# Patient Record
Sex: MALE | Race: WHITE | NOT HISPANIC OR LATINO
[De-identification: names, ages, dates, MRNs, and addresses within clinical notes are randomized per-mention and may not be internally consistent; named-entity substitution may affect disease eponyms.]

---

## 2017-03-02 ENCOUNTER — TRANSCRIPTION ENCOUNTER (OUTPATIENT)
Age: 32
End: 2017-03-02

## 2022-04-21 ENCOUNTER — APPOINTMENT (OUTPATIENT)
Dept: HUMAN REPRODUCTION | Facility: CLINIC | Age: 37
End: 2022-04-21

## 2022-07-25 ENCOUNTER — NON-APPOINTMENT (OUTPATIENT)
Age: 37
End: 2022-07-25

## 2023-06-01 ENCOUNTER — EMERGENCY (EMERGENCY)
Facility: HOSPITAL | Age: 38
LOS: 1 days | Discharge: ANOTHER HOSPITAL | End: 2023-06-01
Attending: EMERGENCY MEDICINE | Admitting: EMERGENCY MEDICINE
Payer: COMMERCIAL

## 2023-06-01 ENCOUNTER — EMERGENCY (EMERGENCY)
Facility: HOSPITAL | Age: 38
LOS: 1 days | Discharge: HOME | End: 2023-06-01
Attending: EMERGENCY MEDICINE | Admitting: EMERGENCY MEDICINE
Payer: COMMERCIAL

## 2023-06-01 VITALS
TEMPERATURE: 98 F | RESPIRATION RATE: 15 BRPM | OXYGEN SATURATION: 98 % | HEART RATE: 77 BPM | SYSTOLIC BLOOD PRESSURE: 137 MMHG | DIASTOLIC BLOOD PRESSURE: 77 MMHG

## 2023-06-01 VITALS
HEART RATE: 81 BPM | OXYGEN SATURATION: 100 % | SYSTOLIC BLOOD PRESSURE: 143 MMHG | RESPIRATION RATE: 16 BRPM | DIASTOLIC BLOOD PRESSURE: 87 MMHG | TEMPERATURE: 99 F

## 2023-06-01 VITALS
TEMPERATURE: 99 F | SYSTOLIC BLOOD PRESSURE: 122 MMHG | RESPIRATION RATE: 18 BRPM | DIASTOLIC BLOOD PRESSURE: 76 MMHG | HEIGHT: 68 IN | HEART RATE: 92 BPM | OXYGEN SATURATION: 99 % | WEIGHT: 160.06 LBS

## 2023-06-01 LAB
ALBUMIN SERPL ELPH-MCNC: 4 G/DL — SIGNIFICANT CHANGE UP (ref 3.3–5)
ALP SERPL-CCNC: 71 U/L — SIGNIFICANT CHANGE UP (ref 30–120)
ALT FLD-CCNC: 21 U/L DA — SIGNIFICANT CHANGE UP (ref 10–60)
ANION GAP SERPL CALC-SCNC: 9 MMOL/L — SIGNIFICANT CHANGE UP (ref 5–17)
AST SERPL-CCNC: 16 U/L — SIGNIFICANT CHANGE UP (ref 10–40)
BASOPHILS # BLD AUTO: 0.06 K/UL — SIGNIFICANT CHANGE UP (ref 0–0.2)
BASOPHILS NFR BLD AUTO: 0.6 % — SIGNIFICANT CHANGE UP (ref 0–2)
BILIRUB SERPL-MCNC: 0.5 MG/DL — SIGNIFICANT CHANGE UP (ref 0.2–1.2)
BUN SERPL-MCNC: 11 MG/DL — SIGNIFICANT CHANGE UP (ref 7–23)
CALCIUM SERPL-MCNC: 9.2 MG/DL — SIGNIFICANT CHANGE UP (ref 8.4–10.5)
CHLORIDE SERPL-SCNC: 104 MMOL/L — SIGNIFICANT CHANGE UP (ref 96–108)
CO2 SERPL-SCNC: 28 MMOL/L — SIGNIFICANT CHANGE UP (ref 22–31)
CREAT SERPL-MCNC: 1 MG/DL — SIGNIFICANT CHANGE UP (ref 0.5–1.3)
EGFR: 101 ML/MIN/1.73M2 — SIGNIFICANT CHANGE UP
EOSINOPHIL # BLD AUTO: 0.46 K/UL — SIGNIFICANT CHANGE UP (ref 0–0.5)
EOSINOPHIL NFR BLD AUTO: 4.8 % — SIGNIFICANT CHANGE UP (ref 0–6)
GLUCOSE SERPL-MCNC: 136 MG/DL — HIGH (ref 70–99)
HCT VFR BLD CALC: 43 % — SIGNIFICANT CHANGE UP (ref 39–50)
HGB BLD-MCNC: 14.5 G/DL — SIGNIFICANT CHANGE UP (ref 13–17)
IMM GRANULOCYTES NFR BLD AUTO: 0.2 % — SIGNIFICANT CHANGE UP (ref 0–0.9)
LACTATE SERPL-SCNC: 1.3 MMOL/L — SIGNIFICANT CHANGE UP (ref 0.7–2)
LYMPHOCYTES # BLD AUTO: 2.67 K/UL — SIGNIFICANT CHANGE UP (ref 1–3.3)
LYMPHOCYTES # BLD AUTO: 27.7 % — SIGNIFICANT CHANGE UP (ref 13–44)
MCHC RBC-ENTMCNC: 31 PG — SIGNIFICANT CHANGE UP (ref 27–34)
MCHC RBC-ENTMCNC: 33.7 GM/DL — SIGNIFICANT CHANGE UP (ref 32–36)
MCV RBC AUTO: 92.1 FL — SIGNIFICANT CHANGE UP (ref 80–100)
MONOCYTES # BLD AUTO: 0.65 K/UL — SIGNIFICANT CHANGE UP (ref 0–0.9)
MONOCYTES NFR BLD AUTO: 6.7 % — SIGNIFICANT CHANGE UP (ref 2–14)
NEUTROPHILS # BLD AUTO: 5.79 K/UL — SIGNIFICANT CHANGE UP (ref 1.8–7.4)
NEUTROPHILS NFR BLD AUTO: 60 % — SIGNIFICANT CHANGE UP (ref 43–77)
NRBC # BLD: 0 /100 WBCS — SIGNIFICANT CHANGE UP (ref 0–0)
PLATELET # BLD AUTO: 227 K/UL — SIGNIFICANT CHANGE UP (ref 150–400)
POTASSIUM SERPL-MCNC: 3.8 MMOL/L — SIGNIFICANT CHANGE UP (ref 3.5–5.3)
POTASSIUM SERPL-SCNC: 3.8 MMOL/L — SIGNIFICANT CHANGE UP (ref 3.5–5.3)
PROT SERPL-MCNC: 6.9 G/DL — SIGNIFICANT CHANGE UP (ref 6–8.3)
RAPID RVP RESULT: SIGNIFICANT CHANGE UP
RBC # BLD: 4.67 M/UL — SIGNIFICANT CHANGE UP (ref 4.2–5.8)
RBC # FLD: 12.5 % — SIGNIFICANT CHANGE UP (ref 10.3–14.5)
SARS-COV-2 RNA SPEC QL NAA+PROBE: SIGNIFICANT CHANGE UP
SODIUM SERPL-SCNC: 141 MMOL/L — SIGNIFICANT CHANGE UP (ref 135–145)
WBC # BLD: 9.65 K/UL — SIGNIFICANT CHANGE UP (ref 3.8–10.5)
WBC # FLD AUTO: 9.65 K/UL — SIGNIFICANT CHANGE UP (ref 3.8–10.5)

## 2023-06-01 PROCEDURE — 99284 EMERGENCY DEPT VISIT MOD MDM: CPT

## 2023-06-01 PROCEDURE — 70491 CT SOFT TISSUE NECK W/DYE: CPT | Mod: 26,MA

## 2023-06-01 PROCEDURE — 80053 COMPREHEN METABOLIC PANEL: CPT

## 2023-06-01 PROCEDURE — 96375 TX/PRO/DX INJ NEW DRUG ADDON: CPT

## 2023-06-01 PROCEDURE — 87040 BLOOD CULTURE FOR BACTERIA: CPT

## 2023-06-01 PROCEDURE — 99285 EMERGENCY DEPT VISIT HI MDM: CPT

## 2023-06-01 PROCEDURE — 83605 ASSAY OF LACTIC ACID: CPT

## 2023-06-01 PROCEDURE — 70491 CT SOFT TISSUE NECK W/DYE: CPT | Mod: MA

## 2023-06-01 PROCEDURE — 85025 COMPLETE CBC W/AUTO DIFF WBC: CPT

## 2023-06-01 PROCEDURE — 36415 COLL VENOUS BLD VENIPUNCTURE: CPT

## 2023-06-01 PROCEDURE — 99285 EMERGENCY DEPT VISIT HI MDM: CPT | Mod: 25

## 2023-06-01 PROCEDURE — 0225U NFCT DS DNA&RNA 21 SARSCOV2: CPT

## 2023-06-01 PROCEDURE — 96365 THER/PROPH/DIAG IV INF INIT: CPT | Mod: XU

## 2023-06-01 RX ORDER — SODIUM CHLORIDE 9 MG/ML
1000 INJECTION INTRAMUSCULAR; INTRAVENOUS; SUBCUTANEOUS ONCE
Refills: 0 | Status: COMPLETED | OUTPATIENT
Start: 2023-06-01 | End: 2023-06-01

## 2023-06-01 RX ORDER — FAMOTIDINE 10 MG/ML
20 INJECTION INTRAVENOUS ONCE
Refills: 0 | Status: COMPLETED | OUTPATIENT
Start: 2023-06-01 | End: 2023-06-01

## 2023-06-01 RX ORDER — DIPHENHYDRAMINE HCL 50 MG
50 CAPSULE ORAL ONCE
Refills: 0 | Status: COMPLETED | OUTPATIENT
Start: 2023-06-01 | End: 2023-06-01

## 2023-06-01 RX ORDER — AMPICILLIN SODIUM AND SULBACTAM SODIUM 250; 125 MG/ML; MG/ML
3 INJECTION, POWDER, FOR SUSPENSION INTRAMUSCULAR; INTRAVENOUS ONCE
Refills: 0 | Status: COMPLETED | OUTPATIENT
Start: 2023-06-01 | End: 2023-06-01

## 2023-06-01 RX ADMIN — SODIUM CHLORIDE 1000 MILLILITER(S): 9 INJECTION INTRAMUSCULAR; INTRAVENOUS; SUBCUTANEOUS at 20:12

## 2023-06-01 RX ADMIN — FAMOTIDINE 20 MILLIGRAM(S): 10 INJECTION INTRAVENOUS at 18:12

## 2023-06-01 RX ADMIN — AMPICILLIN SODIUM AND SULBACTAM SODIUM 3 GRAM(S): 250; 125 INJECTION, POWDER, FOR SUSPENSION INTRAMUSCULAR; INTRAVENOUS at 20:12

## 2023-06-01 RX ADMIN — Medication 125 MILLIGRAM(S): at 18:12

## 2023-06-01 RX ADMIN — SODIUM CHLORIDE 1000 MILLILITER(S): 9 INJECTION INTRAMUSCULAR; INTRAVENOUS; SUBCUTANEOUS at 18:12

## 2023-06-01 RX ADMIN — AMPICILLIN SODIUM AND SULBACTAM SODIUM 200 GRAM(S): 250; 125 INJECTION, POWDER, FOR SUSPENSION INTRAMUSCULAR; INTRAVENOUS at 18:59

## 2023-06-01 RX ADMIN — Medication 50 MILLIGRAM(S): at 18:12

## 2023-06-01 NOTE — ED PROVIDER NOTE - ATTENDING CONTRIBUTION TO CARE
This is a 37-year-old male states he was at [place] he thinks he might of gotten bit by something because he did see a few dots on his forearm was nonpainful was nonpruritic it has now formed serous vesicles with redness around the site he then noticed he had a rash on his arms chest and back also nonpruritic.  Patient states he then noticed some throat swelling feeling like razor blades were in his foot throat and he was unable to eat due to it he also noticed a change in his voice.  He presented to [facility] he received Benadryl steroids and a CAT scan showing swelling near the hyoid.  He was transferred to this facility for ENT evaluation.  Patient states he does feel much better now his throat swelling is better and his voice is back to normal.  He denies any fevers any chills anybody with similar symptoms any numbness any tingling any rash on the palms or soles no rash noted in his mouth.    General: Well appearing, well nourished, in no distress  Head: Normocephalic, atraumatic  Eyes: Conjunctiva clear, sclera non-icteric, EOM intact, PERRL  Mouth: Mucous membranes moist, no mucosal lesions mild pharyngeal edema   Neck: Supple  Heart: Regular rate and rhythm, no murmur or gallop  Lungs: Clear to auscultation  Abdomen: Bowel sounds present, soft, no tenderness, non distended, no organomegaly  Extremities: No amputations or deformities, cyanosis, edema  Musculoskeletal: No crepitation, defects or decreased range of motion, strength intact throughout, pulses intact  Neurologic: No gross deficits CN II-XII intact Sensation intact  Psychiatric: Oriented X3, normal mood and affect  Skin: Warm,dry. Cap refill <2 seconds. raised maculopapular blanching rash on torso and arms R forearm with small vesicles non dermatomal with erythema surrounding  will have ENT evaluate at this time This is a 37-year-old male states he was at [place] he thinks he might of gotten bit by something because he did see a few dots on his forearm was nonpainful was nonpruritic it has now formed serous vesicles with redness around the site he then noticed he had a rash on his arms chest and back also nonpruritic.  Patient states he then noticed some throat swelling feeling like razor blades were in his foot throat and he was unable to eat due to it he also noticed a change in his voice.  He presented to [facility] he received Benadryl steroids and a CAT scan showing swelling near the hyoid.  He was transferred to this facility for ENT evaluation.  Patient states he does feel much better now his throat swelling is better and his voice is back to normal.  He denies any fevers any chills anybody with similar symptoms any numbness any tingling any rash on the palms or soles no rash noted in his mouth.    General: Well appearing, well nourished, in no distress  Head: Normocephalic, atraumatic  Eyes: Conjunctiva clear, sclera non-icteric, EOM intact, PERRL  Mouth: Mucous membranes moist, no mucosal lesions mild pharyngeal erythema no edema   Neck: Supple no swelling non ttp no stridor   Heart: Regular rate and rhythm, no murmur or gallop  Lungs: Clear to auscultation  Abdomen: Bowel sounds present, soft, no tenderness, non distended, no organomegaly  Extremities: No amputations or deformities, cyanosis, edema  Musculoskeletal: No crepitation, defects or decreased range of motion, strength intact throughout, pulses intact  Neurologic: No gross deficits CN II-XII intact Sensation intact  Psychiatric: Oriented X3, normal mood and affect  Skin: Warm,dry. Cap refill <2 seconds. raised maculopapular blanching rash on torso and arms R forearm with small vesicles non dermatomal with erythema surrounding  will have ENT evaluate at this time.Offered pt CDU stay however pt prefers going home and following up with derm as outpatient.

## 2023-06-01 NOTE — ED ADULT NURSE REASSESSMENT NOTE - NS ED NURSE REASSESS COMMENT FT1
received report and assumed care of pt at change of shift. pt awake and alert. denies pain at this time. respirations even and unlabored. no stridor, no drooling,, no audible wheezes. up and about ambulatory to bathroom. visitor at bedside. informed [facility]. NAD

## 2023-06-01 NOTE — ED PROVIDER NOTE - OBJECTIVE STATEMENT
37-year-old male with no known past medical history presents with has been having a slight rash to the right arm/antecubital area over the past 4 to 5 days.  Patient is now developed generalized rash and itchiness on his trunk and neck.  Over the past 1 to 2 days, patient's been having increasing dyspnea and difficulty swallowing.  No known fever or chills.  No acute chest pain.  No abdominal pain.  No vomiting or diarrhea.  Possible started after a bite.  No other known exposures.  No aggravating or alleviating factors otherwise noted.  No other acute complaints.  Patient's previously vaccinated for COVID

## 2023-06-01 NOTE — ED ADULT TRIAGE NOTE - CHIEF COMPLAINT QUOTE
" I was [place], I developed a rash on my right arm, now it has spread, went to Urgent Care and they gave Rx for a cream, they said it may be poison ivy, but now throat is swollen, hard to swallow '

## 2023-06-01 NOTE — ED PROVIDER NOTE - PHYSICAL EXAMINATION
Gen - NAD; well-appearing; A+Ox3   HEENT - NCAT, EOMI, moist mucous membranes, clear oropharynx, midline uvula, no tongue enlargement, no appreciable swelling  Neck - supple  Resp - CTAB, no increased WOB, no stridor  CV -  RRR, no m/r/g  Abd - soft, NT, ND; no guarding or rebound  Back - no CVA tenderness  MSK - FROM of b/l UE and LE, no gross deformities  Extrem - no LE edema  Neuro - no focal motor or sensation deficits  Skin - warm, well perfused; diffuse erythematous macules/patches over trunk, blanching, no nikolsky's sign, no mucosal involvement; small patch of vesicles over R medial forearm

## 2023-06-01 NOTE — ED PROVIDER NOTE - ENMT, MLM
Airway patent, Nasal mucosa clear. Mouth with normal mucosa. Throat has no vesicles, no oropharyngeal exudates and uvula is midline. Positive posterior pharyngeal edema with an irregularly shaped uvula.  No active discharge noted. neck supple. no meningeal signs

## 2023-06-01 NOTE — ED ADULT NURSE NOTE - OBJECTIVE STATEMENT
pt complains of unknown rash, unknown wound to rt arm , and difficulty swallowing. denies eating new foods, or using new products.

## 2023-06-01 NOTE — ED PROVIDER NOTE - OBJECTIVE STATEMENT
37 year old male, no pmh, no known allergies, presenting as transfer from [facility] for rash, throat swelling, and hoarse voice. 37 year old male, no pmh, no known allergies, presenting as transfer from [place] [facility] for rash, throat swelling, and hoarse voice. States that he visited [place] few days ago, came home, noted vesicular patch to R inner forearm, subsequently develop 37 year old male, no pmh, no known allergies, presenting as transfer from [place] [facility] for rash, throat swelling, and hoarse voice. States that he visited [place] few days ago, came home, noted vesicular patch to R inner forearm, subsequently developed full torso red rash, no mucosal involvement, none on palms/soles, no pain or itchiness. Today while at work had transient episode of difficulty tolerating his own saliva so went to [place] ED, given IV steroid/antihistamine, CT neck showing swelling hypopharynx, sent to [facility] for ENT eval. Currently states symptoms are resolved in throat, and feels that rash is also very improved. No trismus, drooling, difficulty breathing, throat pain, vomiting, diarrhea.

## 2023-06-01 NOTE — ED PROVIDER NOTE - NSFOLLOWUPINSTRUCTIONS_ED_ALL_ED_FT
You were seen in the Emergency Department for: rash, throat swelling    You were prescribed to the pharmacy: prednisone, epi-pen  Please follow the instructions on the container/label and ask your pharmacist for any questions/concerns.    For pain, you may take Tylenol (acetaminophen) 650 mg every 6 hours, AND/OR Advil (ibuprofen) 600 mg every 8 hours.    Please follow up with a dermatologist as discussed. You were referred to our Discharge Lounge and someone will call you within 24-48 hours to help set up an appointment. If you are not contacted within that time, please call [phone] to find a provider who is convenient for you.    You should return to the Emergency Department if you feel any new/worsening/persistent symptoms including but not limited to: chest pain, difficulty breathing, loss of consciousness, bleeding, uncontrolled pain, numbness/weakness of a body part

## 2023-06-01 NOTE — ED ADULT NURSE NOTE - NSFALLUNIVINTERV_ED_ALL_ED
Bed/Stretcher in lowest position, wheels locked, appropriate side rails in place/Call bell, personal items and telephone in reach/Instruct patient to call for assistance before getting out of bed/chair/stretcher/Non-slip footwear applied when patient is off stretcher/[place] to call system/Physically safe environment - no spills, clutter or unnecessary equipment/Purposeful proactive rounding/Room/bathroom lighting operational, light cord in reach

## 2023-06-01 NOTE — ED ADULT NURSE NOTE - OBJECTIVE STATEMENT
rash started to right a/c and circular flat area with areas of pallor and center with dried lesions. rash to chest and back hive like .uvula midline but swollen and elongated with tiny lesion left side and posterior palate reddened.  pt aaox3 skin warm and dry no resp distress lungs clear and equal b/l ascultation abd soft non tender + bs  no lip swelling no sob

## 2023-06-01 NOTE — ED PROVIDER NOTE - CLINICAL SUMMARY MEDICAL DECISION MAKING FREE TEXT BOX
Acute possible allergic reaction, possible pharyngeal edema versus infection.  Will check labs, CT, IV meds, reeval

## 2023-06-01 NOTE — ED PROVIDER NOTE - PATIENT PORTAL LINK FT
You can access the FollowMyHealth Patient Portal offered by [facility] by registering at the following website: [email or web address]. By joining [employer]’s FollowMyHealth portal, you will also be able to view your health information using other applications (apps) compatible with our system.

## 2023-06-01 NOTE — ED ADULT NURSE NOTE - NSFALLUNIVINTERV_ED_ALL_ED
Bed/Stretcher in lowest position, wheels locked, appropriate side rails in place/Call bell, personal items and telephone in reach/Instruct patient to call for assistance before getting out of bed/chair/stretcher/Non-slip footwear applied when patient is off stretcher/[place] to call system/Physically safe environment - no spills, clutter or unnecessary equipment/Purposeful proactive rounding/Room/bathroom lighting operational, light cord in reach

## 2023-06-01 NOTE — ED ADULT NURSE NOTE - SOCIAL CONCERNS
None Closure 3 Information: This tab is for additional flaps and grafts above and beyond our usual structured repairs.  Please note if you enter information here it will not currently bill and you will need to add the billing information manually.

## 2023-06-01 NOTE — ED PROVIDER NOTE - CLINICAL SUMMARY MEDICAL DECISION MAKING FREE TEXT BOX
Well appearing young otherwise healthy male presenting with diffuse rash and throat swelling, no obvious trigger, was given steroid/antihistamine at OSH, now much improved, airway protected, no stridor, no complaints here. ENT to eval patient/scope. No indication for epi.

## 2023-06-01 NOTE — ED PROVIDER NOTE - PROGRESS NOTE DETAILS
Discussed with transfer center, ENT Dr. Samayoa, accepts patient at [facility] emergency room, Dr. John.

## 2023-06-01 NOTE — ED ADULT NURSE NOTE - CHIEF COMPLAINT QUOTE
Pt received from [place] for ENT consult. pt c/o hoarseness and throat pain x  2 days and progressively getting worse. Pt was seen at urgent care and given prednisone and benedryl for poision ivy. got worse and went to ED. pt found to have swelling to pharynx. pt speaking in full and complete sentences with no drooling noted. Respirations even and unlabored  No complaints of chest pain, headache, nausea, dizziness, vomiting  SOB, fever, chills verbalized..

## 2023-06-01 NOTE — ED PROVIDER NOTE - PROGRESS NOTE DETAILS
Trip PGY-3: Patient offered CDU stay for continued obs/meds as indicated but states he does not want to stay. Will dc with epi-pen and steroid course to pharmacy. Will give derm f/u via discharge lounge. Strict return precautions given. DC.

## 2023-06-01 NOTE — ED PROVIDER NOTE - DIFFERENTIAL DIAGNOSIS
Rule out acute allergic reaction, angioedema, acute pharyngeal infection, other acute pathology Differential Diagnosis

## 2023-06-01 NOTE — ED ADULT TRIAGE NOTE - CHIEF COMPLAINT QUOTE
Pt received from [place] for ENT consult. pt c/o hoarseness and throat pain x  2 days and progressively getting worse. Pt was seen at urgent care and given prednisone and benedryl for poision ivy. got worse and went to ED. pt found to have swelling to pharynx. pt speaking in full and complete sentences with no drooling noted. Respirations even and unlabored  No complaints of chest pain, headache, nausea, dizziness, vomiting  SOB, fever, chills verbalized..

## 2023-06-01 NOTE — ED ADULT NURSE NOTE - CHPI ED NUR SYMPTOMS NEG
no congestion/no difficulty breathing/no nausea/no shortness of breath/no swelling of face, tongue/no throat itching/no vomiting/no wheezing

## 2023-06-01 NOTE — ED PROVIDER NOTE - CONSIDERATION OF ADMISSION OBSERVATION
Consideration of Admission/Observation Will consider admission based on response to meds, CT findings

## 2023-06-02 VITALS
TEMPERATURE: 99 F | DIASTOLIC BLOOD PRESSURE: 87 MMHG | HEART RATE: 93 BPM | SYSTOLIC BLOOD PRESSURE: 127 MMHG | OXYGEN SATURATION: 97 % | RESPIRATION RATE: 18 BRPM

## 2023-06-02 RX ORDER — EPINEPHRINE 0.3 MG/.3ML
0.3 INJECTION INTRAMUSCULAR; SUBCUTANEOUS
Qty: 1 | Refills: 1
Start: 2023-06-02 | End: 2023-06-03

## 2023-06-02 NOTE — CONSULT NOTE ADULT - COMMENTS
Vital Signs Last 24 Hrs  T(C): 37 (01 Jun 2023 21:54), Max: 37.2 (01 Jun 2023 20:47)  T(F): 98.6 (01 Jun 2023 21:54), Max: 99 (01 Jun 2023 20:47)  HR: 84 (01 Jun 2023 21:54) (81 - 84)  BP: 121/69 (01 Jun 2023 21:54) (121/69 - 143/87)  BP(mean): --  RR: 17 (01 Jun 2023 21:54) (16 - 17)  SpO2: 100% (01 Jun 2023 21:54) (100% - 100%)    Parameters below as of 01 Jun 2023 21:54  Patient On (Oxygen Delivery Method): room air

## 2023-06-02 NOTE — CHART NOTE - NSCHARTNOTEFT_GEN_A_CORE
[facility]  ENT Procedure Note      Name:  Jesse Mccloud   	                            Surgeon:   Matthew Crawford MD	  				  Date of Procedure: 06/01/2023                         Assistant:  None    Record Number:	[id number]                              Anesthesia:  Topical Anesthesia       Preprocedure diagnosis:	Dysphagia, unspecified (R13.10)  	  Postprocedure diagnosis:	Dysphagia, unspecified (R13.10)    Procedure:		Flexible Fiberoptic Laryngoscopy  (80403)    INDICATION:  The patient is a 37-year-old male with no known past medical history who presents to the emergency room at [facility] with a chief complaint of difficulty swallowing for the past several hours.  The patient states he has been having a slight rash to the right arm/antecubital area over the past 4 to 5 days.  Patient is now developed generalized rash and itchiness on his trunk and neck.  Over the past 1 to 2 days, patient's been having increasing dyspnea and difficulty swallowing.  No known fever or chills.  No acute chest pain.  No abdominal pain.  No vomiting or diarrhea.  Possible started after a bite.  No other known exposures.  No aggravating or alleviating factors otherwise noted.  No other acute complaints.  Patient's previously vaccinated for COVID    PROCEDURE: The patient was seen and his bilateral nasal cavities were prepped and sprayed with topical anesthesia of neosynephrine.   Following this, a fiberoptic flexible laryngoscope was passed into the left nasal cavity, and then slowly and meticulously moved posteriorly to the nasopharynx.  There was no evidence of clotted blood within the left anterior and posterior superior lateral nasal wall. There was clear mucous within the nasal cavity.  Once at nasopharynx the skull base and lateral walls of the eustachian tubes were examined for lesions and masses.  There was no blood or masses within the nasopharynx. There was mild prominence of the nasopharyngeal soft tissue consistent with inflammatory reaction.  The fiberoptic endoscope was then carefully directed inferiorly and moved to the hypopharynx and glottis.  There was no fullness of the base of tongue.  There was 1-2+ prominence of the tonsils bilaterally (equally) and without exudate. There was no evidence of retropharyngeal erythema or edema.  There was mild  diffuse erythema  of the pharyngeal wall and supraglottic structure consistent with GERD.  The true vocal cords appeared to be mobile bilaterally.  There was no evidence of foreign body or pooling of secretions, or obvious aspiration or penetration of liquid into the glottis.  The airway was widely patent. The patient tolerated the procedure well.. [facility]  ENT Procedure Note      Name:  Jesse Mccloud   	                            Surgeon:   Matthew Crawford MD	  				  Date of Procedure: 06/01/2023                         Assistant:  None    Record Number:	[id number]                              Anesthesia:  Topical Anesthesia       Preprocedure diagnosis:	Hoarseness  	  Postprocedure diagnosis:	Hoarseness    Procedure:		Flexible Fiberoptic Laryngoscopy  ([id number])    INDICATION:  The patient is a 37-year-old male with no known past medical history who presents to the emergency room at [facility] with a chief complaint of difficulty swallowing for the past several hours.  The patient states he has been having a slight rash to the right arm/antecubital area over the past 4 to 5 days.  Patient is now developed generalized rash and itchiness on his trunk and neck.  Over the past 1 to 2 days, patient's been having increasing dyspnea and difficulty swallowing.  No known fever or chills.  No acute chest pain.  No abdominal pain.  No vomiting or diarrhea.  Possible started after a bite.  No other known exposures.  No aggravating or alleviating factors otherwise noted.  No other acute complaints.  Patient's previously vaccinated for COVID    PROCEDURE: The patient was seen and his bilateral nasal cavities were prepped and sprayed with topical anesthesia of neosynephrine.   Following this, a fiberoptic flexible laryngoscope was passed into the left nasal cavity, and then slowly and meticulously moved posteriorly to the nasopharynx.  There was no evidence of clotted blood within the left anterior and posterior superior lateral nasal wall. There was clear mucous within the nasal cavity.  Once at nasopharynx the skull base and lateral walls of the eustachian tubes were examined for lesions and masses.  There was no blood or masses within the nasopharynx. There was mild prominence of the nasopharyngeal soft tissue consistent with inflammatory reaction.  The fiberoptic endoscope was then carefully directed inferiorly and moved to the hypopharynx and glottis.  There was no fullness of the base of tongue.  There was 1-2+ prominence of the tonsils bilaterally (equally) and without exudate. There was no evidence of retropharyngeal erythema or edema.  There was mild  diffuse erythema  of the pharyngeal wall and supraglottic structure consistent with GERD.  The true vocal cords appeared to be mobile bilaterally.  There was no evidence of foreign body or pooling of secretions, or obvious aspiration or penetration of liquid into the glottis.  The airway was widely patent. The patient tolerated the procedure well.. [facility]  ENT Procedure Note      Name:  Jesse Mccloud   	                            Surgeon:   Matthew Crawford MD	  				  Date of Procedure: 06/01/2023                         Assistant:  None    Record Number:	[id number]                              Anesthesia:  Topical Anesthesia       Preprocedure diagnosis:	Hoarseness  	  Postprocedure diagnosis:	Hoarseness    Procedure:		Flexible Fiberoptic Laryngoscopy  ([id number])    INDICATION:  The patient is a 37-year-old male with no known past medical history who presents to the emergency room at [facility] with a chief complaint of hoarseness for the past several hours.  The patient states he has been having a slight rash to the right arm/antecubital area over the past 4 to 5 days.  Patient is now developed generalized rash and itchiness on his trunk and neck.  Over the past 1 to 2 days, patient's been having increasing dyspnea and difficulty swallowing.  No known fever or chills.  No acute chest pain.  No abdominal pain.  No vomiting or diarrhea.  Possible started after a bite.  No other known exposures.  No aggravating or alleviating factors otherwise noted.  No other acute complaints.  Patient's previously vaccinated for COVID    PROCEDURE: The patient was seen and his bilateral nasal cavities were prepped and sprayed with topical anesthesia of neosynephrine.   Following this, a fiberoptic flexible laryngoscope was passed into the left nasal cavity, and then slowly and meticulously moved posteriorly to the nasopharynx.  There was no evidence of clotted blood within the left anterior and posterior superior lateral nasal wall. There was clear mucous within the nasal cavity.  Once at nasopharynx the skull base and lateral walls of the eustachian tubes were examined for lesions and masses.  There was no blood or masses within the nasopharynx. There was mild prominence of the nasopharyngeal soft tissue consistent with inflammatory reaction.  The fiberoptic endoscope was then carefully directed inferiorly and moved to the hypopharynx and glottis.  There was no fullness of the base of tongue.  There was 1-2+ prominence of the tonsils bilaterally (equally) and without exudate. There was no evidence of retropharyngeal erythema or edema.  There was mild  diffuse erythema  of the pharyngeal wall and supraglottic structure consistent with GERD.  The true vocal cords appeared to be mobile bilaterally.  There was no evidence of foreign body or pooling of secretions, or obvious aspiration or penetration of liquid into the glottis.  The airway was widely patent. The patient tolerated the procedure well..

## 2023-06-02 NOTE — CONSULT NOTE ADULT - SUBJECTIVE AND OBJECTIVE BOX
HPI: The patient is a 37-year-old male with no known past medical history who presents to the emergency room at [facility] with a chief complaint of throat closing up and difficulty swallowing for the past several hours.  The patient states he has been having a slight rash to the right arm/antecubital area over the past 4 to 5 days.  Patient is now developed generalized rash and itchiness on his trunk and neck.  Over the past 1 to 2 days, patient's been having increasing dyspnea and difficulty swallowing.  No known fever or chills.  No acute chest pain.  No abdominal pain.  No vomiting or diarrhea.  Possible started after a bite.  No other known exposures.  No aggravating or alleviating factors otherwise noted.  No other acute complaints.  Patient's previously vaccinated for COVID    HIV:    HIV Test Questions:  · In accordance with [place] State law, we offer every patient who comes to our ED an HIV test. Would you like to be tested today?	Opt out    PAST MEDICAL/SURGICAL/FAMILY/SOCIAL HISTORY:    Tobacco Usage:  · Tobacco Usage	Current some day smoker    ALLERGIES AND HOME MEDICATIONS:   Allergies:        Allergies:  	No Known Allergies:     Home Medications:   * Outpatient Medication Status not yet specified     HPI: The patient is a 37-year-old male with no known past medical history who presents to the emergency room at [facility] with a chief complaint of difficulty swallowing for the past several hours.  The patient states he has been having a slight rash to the right arm/antecubital area over the past 4 to 5 days.  Patient is now developed generalized rash and itchiness on his trunk and neck.  Over the past 1 to 2 days, patient's been having increasing dyspnea and difficulty swallowing.  No known fever or chills.  No acute chest pain.  No abdominal pain.  No vomiting or diarrhea.  Possible started after a bite.  No other known exposures.  No aggravating or alleviating factors otherwise noted.  No other acute complaints.  Patient's previously vaccinated for COVID    HIV:    HIV Test Questions:  · In accordance with [place] State law, we offer every patient who comes to our ED an HIV test. Would you like to be tested today?	Opt out    PAST MEDICAL/SURGICAL/FAMILY/SOCIAL HISTORY:    Tobacco Usage:  · Tobacco Usage	Current some day smoker    ALLERGIES AND HOME MEDICATIONS:   Allergies:        Allergies:  	No Known Allergies:     Home Medications:   * Outpatient Medication Status not yet specified     HPI: The patient is a 37-year-old male with no known past medical history who presents to the emergency room at [facility] with a chief complaint of hoarseness for the past several hours.  The patient states he has been having a slight rash to the right arm/antecubital area over the past 4 to 5 days.  Patient is now developed generalized rash and itchiness on his trunk and neck.  Over the past 1 to 2 days, patient's been having increasing dyspnea and difficulty swallowing.  No known fever or chills.  No acute chest pain.  No abdominal pain.  No vomiting or diarrhea.  Possible started after a bite.  No other known exposures.  No aggravating or alleviating factors otherwise noted.  No other acute complaints.  Patient's previously vaccinated for COVID    HIV:    HIV Test Questions:  · In accordance with [place] law, we offer every patient who comes to our ED an HIV test. Would you like to be tested today?	Opt out    PAST MEDICAL/SURGICAL/FAMILY/SOCIAL HISTORY:    Tobacco Usage:  · Tobacco Usage	Current some day smoker    ALLERGIES AND HOME MEDICATIONS:   Allergies:        Allergies:  	No Known Allergies:     Home Medications:   * Outpatient Medication Status not yet specified

## 2023-06-02 NOTE — CONSULT NOTE ADULT - ASSESSMENT
Assessment:  The patient is a 37-year-old male with no known past medical history who presents to the emergency room at [facility] with a chief complaint of throat closing up and difficulty swallowing for the past several hours. Exam consistent with angioedema of pharynx. Now mostly resolved, Airway widely patent    Plan:  1) home with medrol dose pack   2) benadryl prn  3) f/u with allergist prn as outpatient

## 2023-06-02 NOTE — CONSULT NOTE ADULT - ENMT COMMENTS
Flexible Fiberoptic Laryngoscopy: clear nasopharynx to glottis, tvc mobile b/l, **see full procedure note**

## 2023-06-05 LAB
A PHAGOCYTOPH IGG TITR SER IF: SIGNIFICANT CHANGE UP TITER
B BURGDOR AB SER QL IA: NEGATIVE — SIGNIFICANT CHANGE UP
B MICROTI IGG TITR SER: SIGNIFICANT CHANGE UP TITER
E CHAFFEENSIS IGG TITR SER IF: SIGNIFICANT CHANGE UP TITER

## 2023-06-07 LAB
CULTURE RESULTS: SIGNIFICANT CHANGE UP
CULTURE RESULTS: SIGNIFICANT CHANGE UP
SPECIMEN SOURCE: SIGNIFICANT CHANGE UP
SPECIMEN SOURCE: SIGNIFICANT CHANGE UP

## 2023-12-06 NOTE — ED PROVIDER NOTE - SKIN [+], MLM
Therapy has seen patient, and are recommending IPR/ARU. Patient prefers ARU, [facility], as this site is closer to their home. This writer contacted Dr Pravin Lund thru number provided in PS, and LM for return call. Will follow.     Neetu River RN pruritis/RASH

## 2024-06-21 ENCOUNTER — NON-APPOINTMENT (OUTPATIENT)
Age: 39
End: 2024-06-21

## 2024-06-30 ENCOUNTER — NON-APPOINTMENT (OUTPATIENT)
Age: 39
End: 2024-06-30

## 2024-07-31 NOTE — ED PROVIDER NOTE - PHYSICAL EXAMINATION
Vaccine status unknown
Positive small abrasion type wound to the anterior antecubital area on the right arm.  Some surrounding urticaria surrounding this wound, with urticaria diffusely across the chest back and bilateral arms.  Positive blanches.  Nontender, no crepitus.

## 2025-01-01 NOTE — ED ADULT NURSE NOTE - CHIEF COMPLAINT QUOTE
BIBEMS from the street s/p being found on the ground by a bystander. Pt endorses drinking some beers but is sluggish to respond and is responsive to painful stimuli only.  in triage. Pt changed into yellow gown, belongings secured. MD called for eval, pt taken directly to CT scan. " [place], I developed a rash on my right arm, now it has spread, went to Urgent Care and they gave Rx for a cream, they said it may be poison ivy, but now throat is swollen, hard to swallow '